# Patient Record
Sex: MALE | URBAN - METROPOLITAN AREA
[De-identification: names, ages, dates, MRNs, and addresses within clinical notes are randomized per-mention and may not be internally consistent; named-entity substitution may affect disease eponyms.]

---

## 2021-06-09 ENCOUNTER — APPOINTMENT (OUTPATIENT)
Dept: URBAN - METROPOLITAN AREA CLINIC 260 | Age: 36
Setting detail: DERMATOLOGY
End: 2021-06-11

## 2021-06-09 VITALS — WEIGHT: 265 LBS | HEIGHT: 71 IN

## 2021-06-09 DIAGNOSIS — L74.51 PRIMARY FOCAL HYPERHIDROSIS: ICD-10-CM

## 2021-06-09 PROBLEM — L74.510 PRIMARY FOCAL HYPERHIDROSIS, AXILLA: Status: ACTIVE | Noted: 2021-06-09

## 2021-06-09 PROCEDURE — 99204 OFFICE O/P NEW MOD 45 MIN: CPT

## 2021-06-09 PROCEDURE — OTHER PRESCRIPTION: OTHER

## 2021-06-09 PROCEDURE — OTHER ADDITIONAL NOTES: OTHER

## 2021-06-09 PROCEDURE — OTHER COUNSELING: OTHER

## 2021-06-09 RX ORDER — GLYCOPYRROLATE 1 MG/1
TABLET ORAL QD
Qty: 90 | Refills: 0 | Status: ERX | COMMUNITY
Start: 2021-06-09

## 2021-06-09 ASSESSMENT — LOCATION DETAILED DESCRIPTION DERM
LOCATION DETAILED: LEFT AXILLARY VAULT
LOCATION DETAILED: RIGHT AXILLARY VAULT

## 2021-06-09 ASSESSMENT — LOCATION ZONE DERM: LOCATION ZONE: AXILLAE

## 2021-06-09 ASSESSMENT — LOCATION SIMPLE DESCRIPTION DERM
LOCATION SIMPLE: RIGHT AXILLARY VAULT
LOCATION SIMPLE: LEFT AXILLARY VAULT

## 2021-06-09 NOTE — PROCEDURE: ADDITIONAL NOTES
Detail Level: Simple
Render Risk Assessment In Note?: no
Additional Notes: Discussed options. Recommend Robinul for 1 month. If this is not effective come in and we will go to the next step of Botox initiation. Yahaira dry also an option.

## 2021-07-14 ENCOUNTER — APPOINTMENT (OUTPATIENT)
Dept: URBAN - METROPOLITAN AREA CLINIC 260 | Age: 36
Setting detail: DERMATOLOGY
End: 2021-07-15

## 2021-07-14 VITALS — HEIGHT: 73 IN | WEIGHT: 220 LBS

## 2021-07-14 DIAGNOSIS — L74.51 PRIMARY FOCAL HYPERHIDROSIS: ICD-10-CM

## 2021-07-14 PROBLEM — L74.510 PRIMARY FOCAL HYPERHIDROSIS, AXILLA: Status: ACTIVE | Noted: 2021-07-14

## 2021-07-14 PROCEDURE — OTHER COUNSELING: OTHER

## 2021-07-14 PROCEDURE — 99214 OFFICE O/P EST MOD 30 MIN: CPT

## 2021-07-14 PROCEDURE — OTHER ADDITIONAL NOTES: OTHER

## 2021-07-14 PROCEDURE — OTHER PRESCRIPTION: OTHER

## 2021-07-14 RX ORDER — GLYCOPYRROLATE 1 MG/1
6MG TABLET ORAL QD
Qty: 540 | Refills: 3 | Status: ERX | COMMUNITY
Start: 2021-07-14

## 2021-07-14 ASSESSMENT — LOCATION SIMPLE DESCRIPTION DERM
LOCATION SIMPLE: LEFT AXILLARY VAULT
LOCATION SIMPLE: RIGHT AXILLARY VAULT

## 2021-07-14 ASSESSMENT — LOCATION DETAILED DESCRIPTION DERM
LOCATION DETAILED: LEFT AXILLARY VAULT
LOCATION DETAILED: RIGHT AXILLARY VAULT

## 2021-07-14 ASSESSMENT — LOCATION ZONE DERM: LOCATION ZONE: AXILLAE

## 2021-07-14 NOTE — PROCEDURE: ADDITIONAL NOTES
Detail Level: Simple
Render Risk Assessment In Note?: no
Additional Notes: Patient has been dealing with hyperhidrosis for 15 plus years. Patient states this condition impairs multiple areas of his daily life, activities and quality of life. Patient states he stains clothes regularly. He has tried OTC products, Rx strength topica drysol and Robinul without adequate control of symptoms.

## 2023-02-16 NOTE — HPI: SWEATING (HYPERHIDROSIS)
How Severe Is It?: severe
Is This A New Presentation, Or A Follow-Up?: Sweating
Sweating Severity Scale: 3- The sweating is barely tolerable and frequently interferes with daily activities
Ear Wedge Repair Text: A wedge excision was completed by carrying down an excision through the full thickness of the ear and cartilage with an inward facing Burow's triangle. The wound was then closed in a layered fashion.